# Patient Record
Sex: FEMALE | Race: WHITE | NOT HISPANIC OR LATINO | Employment: UNEMPLOYED | URBAN - METROPOLITAN AREA
[De-identification: names, ages, dates, MRNs, and addresses within clinical notes are randomized per-mention and may not be internally consistent; named-entity substitution may affect disease eponyms.]

---

## 2017-10-11 ENCOUNTER — HOSPITAL ENCOUNTER (EMERGENCY)
Facility: HOSPITAL | Age: 37
Discharge: HOME | End: 2017-10-11
Attending: EMERGENCY MEDICINE

## 2017-10-11 VITALS
OXYGEN SATURATION: 99 % | WEIGHT: 200 LBS | RESPIRATION RATE: 17 BRPM | DIASTOLIC BLOOD PRESSURE: 86 MMHG | HEIGHT: 66 IN | SYSTOLIC BLOOD PRESSURE: 142 MMHG | HEART RATE: 80 BPM | TEMPERATURE: 98 F | BODY MASS INDEX: 32.14 KG/M2

## 2017-10-11 DIAGNOSIS — L25.9 CONTACT DERMATITIS, UNSPECIFIED CONTACT DERMATITIS TYPE, UNSPECIFIED TRIGGER: Primary | ICD-10-CM

## 2017-10-11 DIAGNOSIS — R21 RASH OF HANDS: ICD-10-CM

## 2017-10-11 PROCEDURE — 99283 EMERGENCY DEPT VISIT LOW MDM: CPT

## 2017-10-11 RX ORDER — PETROLATUM,WHITE
OINTMENT IN PACKET (GRAM) TOPICAL EVERY 4 HOURS PRN
Qty: 49 G | Refills: 0 | Status: SHIPPED | OUTPATIENT
Start: 2017-10-11 | End: 2017-10-25

## 2017-10-11 RX ORDER — TRIAMCINOLONE ACETONIDE 0.25 MG/ML
1 LOTION TOPICAL DAILY
Qty: 60 ML | Refills: 0 | Status: SHIPPED | OUTPATIENT
Start: 2017-10-11 | End: 2017-10-18

## 2017-10-12 NOTE — ED PROVIDER NOTES
"Encounter Date: 10/11/2017       History     Chief Complaint   Patient presents with    Rash     pt. has rash to arms and hands for 4 months.      Kayy Lora is a 37 y.o. F who  has no past medical history on file.    The patient presents to the ED due to rash.  Patient states the rash has been present for several months. 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 She reports her daughter has had a similar rash but not as severe. Her daughter saw her primary care doctor and was prescribed a lotion which helped her rash.  The patient was told to try "Nix" which she started on Monday, however, the rash has been persistent. She denies fever, N/V, or systemic symptoms. She denies noticing similar rash anywhere else.            Review of patient's allergies indicates:  No Known Allergies  No past medical history on file.  Past Surgical History:   Procedure Laterality Date    CHOLECYSTECTOMY  2010     No family history on file.  Social History   Substance Use Topics    Smoking status: Never Smoker    Smokeless tobacco: Not on file    Alcohol use No     Review of Systems   Constitutional: Negative for chills and fever.   HENT: Negative for sore throat.    Respiratory: Negative for cough and shortness of breath.    Cardiovascular: Negative for chest pain.   Gastrointestinal: Negative for nausea and vomiting.   Genitourinary: Negative for dysuria, frequency and urgency.   Musculoskeletal: Negative for back pain.   Skin: Positive for rash. Negative for wound.   Neurological: Negative for syncope and weakness.   Hematological: Does not bruise/bleed easily.   Psychiatric/Behavioral: Negative for agitation, behavioral problems and confusion.       Physical Exam     Initial Vitals [10/11/17 2259]   BP Pulse Resp Temp SpO2   (!) 142/86 80 16 98.1 °F (36.7 °C) 97 %      MAP       104.67         Physical Exam    Nursing note and vitals reviewed.  Constitutional: She appears well-developed and " well-nourished. She is not diaphoretic. No distress.   HENT:   Head: Normocephalic and atraumatic.   Mouth/Throat: Oropharynx is clear and moist.   Eyes: EOM are normal. Pupils are equal, round, and reactive to light.   Neck: No tracheal deviation present.   Cardiovascular: Normal rate, regular rhythm, normal heart sounds and intact distal pulses.   Pulmonary/Chest: Breath sounds normal. No stridor. No respiratory distress. She has no wheezes.   Abdominal: Soft. Bowel sounds are normal. She exhibits no distension and no mass. There is no tenderness.   Musculoskeletal: Normal range of motion. She exhibits no edema.   Dry, scaly/flaking, erythematous rash with cracking blisters to bilateral hands and fingers, circumferential in some areas  Slight fine maculopapular rash to R forearm   Neurological: She is alert and oriented to person, place, and time. She has normal strength. No cranial nerve deficit or sensory deficit.   Skin: Skin is warm and dry. Capillary refill takes less than 2 seconds. Abrasion and rash noted. Rash is maculopapular. There is erythema. No pallor.   Psychiatric: She has a normal mood and affect. Her behavior is normal. Thought content normal.         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  She has been trying Nix without improvement.  No fever, associated rash to other areas, or other systemic symptoms.  Differential Diagnosis:   Differential Diagnosis includes, but is not limited to:  Cellulitis, abscess, necrotizing fasciitis, osteomyelitis, septic joint, MRSA, DVT, drug eruption, allergic/contact dermatitis, EM/SJS, viral exanthem, local trauma/contusion    ED Management:  On exam, rash most consistent with contact dermatitis, as patient works with multiple  soaps and lotions, including daily prolonged use of gloves.  Plan to prescribe topical corticosteroid cream, as well as Vaseline for emollient. Patient and family counseled and comfortable with plan for topical medication use and close f/u with PCP and/or Dermatology as OP.  Upon re-evaluation, the patient's status has remained stabel.    After complete ED evaluation, clinical impression is most consistent with contact dermatitis.    At this time, I believe the patient is stable for discharge from the ED. The patient and any additional family present were updated with test results, overall impression, and further plan of care. All questions answered. Patient expressed understanding and agreed with current plan for discharge and PCP follow-up within 1 week. Strict return precautions were provided, including fever, worsening of current symptoms or any other concerns.                      ED Course      Clinical Impression:   The primary encounter diagnosis was Contact dermatitis, unspecified contact dermatitis type, unspecified trigger. A diagnosis of Rash of hands was also pertinent to this visit.                           Clayton Wilkinson MD  10/17/17 0525

## 2017-10-12 NOTE — ED TRIAGE NOTES
37y F ambulatory to ED from home. Pt with c/o rash to left hand and right forearm x4 months. Pt c/o pain, itching, burning to rash. Has never had rash evaluated